# Patient Record
Sex: MALE | Race: WHITE | NOT HISPANIC OR LATINO | URBAN - METROPOLITAN AREA
[De-identification: names, ages, dates, MRNs, and addresses within clinical notes are randomized per-mention and may not be internally consistent; named-entity substitution may affect disease eponyms.]

---

## 2017-04-13 ENCOUNTER — AMBULATORY - HEALTHEAST (OUTPATIENT)
Dept: CARDIOLOGY | Facility: CLINIC | Age: 82
End: 2017-04-13

## 2017-04-13 DIAGNOSIS — Z95.0 PACEMAKER: ICD-10-CM

## 2017-05-12 ENCOUNTER — COMMUNICATION - HEALTHEAST (OUTPATIENT)
Dept: TELEHEALTH | Facility: CLINIC | Age: 82
End: 2017-05-12

## 2017-05-12 ENCOUNTER — OFFICE VISIT - HEALTHEAST (OUTPATIENT)
Dept: CARDIOLOGY | Facility: CLINIC | Age: 82
End: 2017-05-12

## 2017-05-12 DIAGNOSIS — I25.10 CORONARY ARTERY DISEASE INVOLVING NATIVE CORONARY ARTERY OF NATIVE HEART WITHOUT ANGINA PECTORIS: ICD-10-CM

## 2017-05-12 DIAGNOSIS — E78.5 DYSLIPIDEMIA: ICD-10-CM

## 2017-05-12 DIAGNOSIS — I35.0 NONRHEUMATIC AORTIC VALVE STENOSIS: ICD-10-CM

## 2017-05-12 DIAGNOSIS — I10 ESSENTIAL HYPERTENSION: ICD-10-CM

## 2017-05-12 ASSESSMENT — MIFFLIN-ST. JEOR: SCORE: 1400.15

## 2017-06-20 ENCOUNTER — HOSPITAL ENCOUNTER (OUTPATIENT)
Dept: CARDIOLOGY | Facility: HOSPITAL | Age: 82
Discharge: HOME | End: 2017-06-20
Attending: INTERNAL MEDICINE

## 2017-06-20 DIAGNOSIS — I35.0 NONRHEUMATIC AORTIC VALVE STENOSIS: ICD-10-CM

## 2017-06-20 ASSESSMENT — MIFFLIN-ST. JEOR: SCORE: 1400.15

## 2017-06-21 LAB
AORTIC ROOT: 3.7 CM
AORTIC VALVE MEAN VELOCITY: 67.7 CM/S
AV DIMENSIONLESS INDEX VTI: 0.9
AV MEAN GRADIENT: 2 MMHG
AV PEAK GRADIENT: 3.3 MMHG
AV VALVE AREA: 2.7 CM2
AV VELOCITY RATIO: 0.9
BSA FOR ECHO PROCEDURE: 1.93 M2
CV BLOOD PRESSURE: NORMAL MMHG
CV ECHO HEIGHT: 68 IN
CV ECHO WEIGHT: 171 LBS
DOP CALC AO PEAK VEL: 90.5 CM/S
DOP CALC AO VTI: 22.3 CM
DOP CALC LVOT AREA: 3.14 CM2
DOP CALC LVOT DIAMETER: 2 CM
DOP CALC LVOT PEAK VEL: 84.2 CM/S
DOP CALC LVOT STROKE VOLUME: 60.3 CM3
DOP CALCLVOT PEAK VEL VTI: 19.2 CM
EJECTION FRACTION: 74 % (ref 55–75)
FRACTIONAL SHORTENING: 48.8 % (ref 28–44)
INTERVENTRICULAR SEPTUM IN END DIASTOLE: 0.79 CM (ref 0.6–1)
IVS/PW RATIO: 0.6
LA AREA 1: 14.4 CM2
LA AREA 2: 16.6 CM2
LEFT ATRIUM LENGTH: 4.5 CM
LEFT ATRIUM SIZE: 3.6 CM
LEFT ATRIUM TO AORTIC ROOT RATIO: 0.97 NO UNITS
LEFT ATRIUM VOLUME INDEX: 23.4 ML/M2
LEFT ATRIUM VOLUME: 45.2 CM3
LEFT VENTRICLE CARDIAC INDEX: 1.9 L/MIN/M2
LEFT VENTRICLE CARDIAC OUTPUT: 3.7 L/MIN
LEFT VENTRICLE DIASTOLIC VOLUME INDEX: 49.4 CM3/M2 (ref 34–74)
LEFT VENTRICLE DIASTOLIC VOLUME: 95.3 CM3 (ref 62–150)
LEFT VENTRICLE HEART RATE: 61 BPM
LEFT VENTRICLE MASS INDEX: 74 G/M2
LEFT VENTRICLE SYSTOLIC VOLUME INDEX: 13.1 CM3/M2 (ref 11–31)
LEFT VENTRICLE SYSTOLIC VOLUME: 25.2 CM3 (ref 21–61)
LEFT VENTRICULAR INTERNAL DIMENSION IN DIASTOLE: 4.1 CM (ref 4.2–5.8)
LEFT VENTRICULAR INTERNAL DIMENSION IN SYSTOLE: 2.1 CM (ref 2.5–4)
LEFT VENTRICULAR MASS: 142.8 G
LEFT VENTRICULAR OUTFLOW TRACT MEAN GRADIENT: 2 MMHG
LEFT VENTRICULAR OUTFLOW TRACT MEAN VELOCITY: 54.6 CM/S
LEFT VENTRICULAR OUTFLOW TRACT PEAK GRADIENT: 3 MMHG
LEFT VENTRICULAR POSTERIOR WALL IN END DIASTOLE: 1.32 CM (ref 0.6–1)
LV STROKE VOLUME INDEX: 31.2 ML/M2
MITRAL VALVE DECELERATION SLOPE: 2530 MM/S2
MITRAL VALVE E/A RATIO: 1
MITRAL VALVE PRESSURE HALF-TIME: 89 MS
MV AVERAGE E/E' RATIO: 12.2 CM/S
MV DECELERATION TIME: 310 MS
MV E'TISSUE VEL-LAT: 7.25 CM/S
MV E'TISSUE VEL-MED: 5.03 CM/S
MV LATERAL E/E' RATIO: 10.3
MV MEDIAL E/E' RATIO: 14.9
MV PEAK A VELOCITY: 75.2 CM/S
MV PEAK E VELOCITY: 74.7 CM/S
MV VALVE AREA PRESSURE 1/2 METHOD: 2.5 CM2
NUC REST DIASTOLIC VOLUME INDEX: 2736 LBS
NUC REST SYSTOLIC VOLUME INDEX: 68 IN
RIGHT VENTRICULAR INTERNAL DIMENSION IN DYSTOLE: 1.43 CM

## 2017-07-20 ENCOUNTER — AMBULATORY - HEALTHEAST (OUTPATIENT)
Dept: CARDIOLOGY | Facility: CLINIC | Age: 82
End: 2017-07-20

## 2017-07-20 DIAGNOSIS — Z95.0 PACEMAKER: ICD-10-CM

## 2017-07-20 LAB — HCC DEVICE COMMENTS: NORMAL

## 2017-10-26 ENCOUNTER — AMBULATORY - HEALTHEAST (OUTPATIENT)
Dept: CARDIOLOGY | Facility: CLINIC | Age: 82
End: 2017-10-26

## 2017-10-26 DIAGNOSIS — Z95.0 PACEMAKER: ICD-10-CM

## 2017-10-27 LAB — HCC DEVICE COMMENTS: NORMAL

## 2017-11-17 ENCOUNTER — AMBULATORY - HEALTHEAST (OUTPATIENT)
Dept: CARDIOLOGY | Facility: CLINIC | Age: 82
End: 2017-11-17

## 2017-11-17 DIAGNOSIS — Z95.0 CARDIAC PACEMAKER IN SITU: ICD-10-CM

## 2017-11-17 LAB — HCC DEVICE COMMENTS: NORMAL

## 2017-11-17 ASSESSMENT — MIFFLIN-ST. JEOR: SCORE: 1404.69

## 2018-02-21 ENCOUNTER — AMBULATORY - HEALTHEAST (OUTPATIENT)
Dept: CARDIOLOGY | Facility: CLINIC | Age: 83
End: 2018-02-21

## 2018-02-21 DIAGNOSIS — Z95.0 CARDIAC PACEMAKER IN SITU: ICD-10-CM

## 2018-02-21 LAB — HCC DEVICE COMMENTS: NORMAL

## 2018-03-29 ENCOUNTER — AMBULATORY - HEALTHEAST (OUTPATIENT)
Dept: CARDIOLOGY | Facility: CLINIC | Age: 83
End: 2018-03-29

## 2018-03-29 DIAGNOSIS — Z95.0 CARDIAC PACEMAKER IN SITU: ICD-10-CM

## 2018-03-29 LAB — HCC DEVICE COMMENTS: NORMAL

## 2018-05-04 ENCOUNTER — AMBULATORY - HEALTHEAST (OUTPATIENT)
Dept: CARDIOLOGY | Facility: CLINIC | Age: 83
End: 2018-05-04

## 2018-05-04 DIAGNOSIS — Z95.0 CARDIAC PACEMAKER IN SITU: ICD-10-CM

## 2018-05-07 LAB
HCC DEVICE COMMENTS: NORMAL
HCC DEVICE IMPLANTING PROVIDER: NORMAL
HCC DEVICE MANUFACTURE: NORMAL
HCC DEVICE MODEL: NORMAL
HCC DEVICE SERIAL NUMBER: NORMAL
HCC DEVICE TYPE: NORMAL

## 2018-06-06 ENCOUNTER — AMBULATORY - HEALTHEAST (OUTPATIENT)
Dept: CARDIOLOGY | Facility: CLINIC | Age: 83
End: 2018-06-06

## 2018-06-06 DIAGNOSIS — Z95.0 CARDIAC PACEMAKER IN SITU: ICD-10-CM

## 2018-06-08 ENCOUNTER — COMMUNICATION - HEALTHEAST (OUTPATIENT)
Dept: ADMINISTRATIVE | Facility: CLINIC | Age: 83
End: 2018-06-08

## 2018-07-19 ENCOUNTER — AMBULATORY - HEALTHEAST (OUTPATIENT)
Dept: CARDIOLOGY | Facility: CLINIC | Age: 83
End: 2018-07-19

## 2018-07-19 DIAGNOSIS — Z95.0 CARDIAC PACEMAKER IN SITU: ICD-10-CM

## 2018-08-22 ENCOUNTER — AMBULATORY - HEALTHEAST (OUTPATIENT)
Dept: CARDIOLOGY | Facility: CLINIC | Age: 83
End: 2018-08-22

## 2018-08-22 DIAGNOSIS — Z95.0 CARDIAC PACEMAKER IN SITU: ICD-10-CM

## 2018-09-26 ENCOUNTER — AMBULATORY - HEALTHEAST (OUTPATIENT)
Dept: CARDIOLOGY | Facility: CLINIC | Age: 83
End: 2018-09-26

## 2018-09-26 ENCOUNTER — OFFICE VISIT - HEALTHEAST (OUTPATIENT)
Dept: CARDIOLOGY | Facility: CLINIC | Age: 83
End: 2018-09-26

## 2018-09-26 DIAGNOSIS — R01.1 HEART MURMUR: ICD-10-CM

## 2018-09-26 DIAGNOSIS — Z95.0 CARDIAC PACEMAKER IN SITU: ICD-10-CM

## 2018-09-26 DIAGNOSIS — I25.10 CORONARY ARTERY DISEASE INVOLVING NATIVE CORONARY ARTERY OF NATIVE HEART WITHOUT ANGINA PECTORIS: ICD-10-CM

## 2018-09-26 DIAGNOSIS — I10 ESSENTIAL HYPERTENSION: ICD-10-CM

## 2018-09-26 DIAGNOSIS — E78.5 DYSLIPIDEMIA: ICD-10-CM

## 2018-09-26 RX ORDER — CALCIUM POLYCARBOPHIL 625 MG
1 TABLET ORAL DAILY
Status: SHIPPED | COMMUNITY
Start: 2018-08-20

## 2018-09-26 ASSESSMENT — MIFFLIN-ST. JEOR: SCORE: 1379.74

## 2018-10-31 ENCOUNTER — AMBULATORY - HEALTHEAST (OUTPATIENT)
Dept: CARDIOLOGY | Facility: CLINIC | Age: 83
End: 2018-10-31

## 2018-10-31 DIAGNOSIS — Z95.0 CARDIAC PACEMAKER IN SITU: ICD-10-CM

## 2018-12-20 ENCOUNTER — AMBULATORY - HEALTHEAST (OUTPATIENT)
Dept: CARDIOLOGY | Facility: CLINIC | Age: 83
End: 2018-12-20

## 2018-12-20 DIAGNOSIS — Z95.0 CARDIAC PACEMAKER IN SITU: ICD-10-CM

## 2019-01-21 ENCOUNTER — AMBULATORY - HEALTHEAST (OUTPATIENT)
Dept: CARDIOLOGY | Facility: CLINIC | Age: 84
End: 2019-01-21

## 2019-01-21 DIAGNOSIS — Z95.0 CARDIAC PACEMAKER IN SITU: ICD-10-CM

## 2019-01-22 ENCOUNTER — COMMUNICATION - HEALTHEAST (OUTPATIENT)
Dept: CARDIOLOGY | Facility: CLINIC | Age: 84
End: 2019-01-22

## 2019-01-23 ENCOUNTER — AMBULATORY - HEALTHEAST (OUTPATIENT)
Dept: CARDIOLOGY | Facility: CLINIC | Age: 84
End: 2019-01-23

## 2019-01-23 ENCOUNTER — SURGERY - HEALTHEAST (OUTPATIENT)
Dept: CARDIOLOGY | Facility: CLINIC | Age: 84
End: 2019-01-23

## 2019-01-23 DIAGNOSIS — Z95.0 CARDIAC PACEMAKER IN SITU: ICD-10-CM

## 2019-01-23 DIAGNOSIS — I44.2 COMPLETE HEART BLOCK (H): ICD-10-CM

## 2019-01-23 DIAGNOSIS — Z45.010 PACEMAKER BATTERY DEPLETION: ICD-10-CM

## 2019-01-29 ENCOUNTER — COMMUNICATION - HEALTHEAST (OUTPATIENT)
Dept: CARDIOLOGY | Facility: CLINIC | Age: 84
End: 2019-01-29

## 2019-02-05 ENCOUNTER — SURGERY - HEALTHEAST (OUTPATIENT)
Dept: CARDIOLOGY | Facility: CLINIC | Age: 84
End: 2019-02-05

## 2019-02-05 ASSESSMENT — MIFFLIN-ST. JEOR: SCORE: 1395.17

## 2019-02-12 ENCOUNTER — AMBULATORY - HEALTHEAST (OUTPATIENT)
Dept: CARDIOLOGY | Facility: CLINIC | Age: 84
End: 2019-02-12

## 2019-02-12 DIAGNOSIS — Z95.0 CARDIAC PACEMAKER IN SITU: ICD-10-CM

## 2019-02-12 ASSESSMENT — MIFFLIN-ST. JEOR: SCORE: 1380.33

## 2019-02-21 ENCOUNTER — COMMUNICATION - HEALTHEAST (OUTPATIENT)
Dept: CARDIOLOGY | Facility: CLINIC | Age: 84
End: 2019-02-21

## 2019-05-15 ENCOUNTER — AMBULATORY - HEALTHEAST (OUTPATIENT)
Dept: CARDIOLOGY | Facility: CLINIC | Age: 84
End: 2019-05-15

## 2019-05-15 DIAGNOSIS — Z95.0 CARDIAC PACEMAKER IN SITU: ICD-10-CM

## 2019-05-22 ENCOUNTER — COMMUNICATION - HEALTHEAST (OUTPATIENT)
Dept: CARDIOLOGY | Facility: CLINIC | Age: 84
End: 2019-05-22

## 2019-07-24 ENCOUNTER — COMMUNICATION - HEALTHEAST (OUTPATIENT)
Dept: ADMINISTRATIVE | Facility: CLINIC | Age: 84
End: 2019-07-24

## 2019-08-19 ENCOUNTER — AMBULATORY - HEALTHEAST (OUTPATIENT)
Dept: CARDIOLOGY | Facility: CLINIC | Age: 84
End: 2019-08-19

## 2019-08-19 DIAGNOSIS — Z95.0 CARDIAC PACEMAKER IN SITU: ICD-10-CM

## 2019-10-29 ENCOUNTER — OFFICE VISIT - HEALTHEAST (OUTPATIENT)
Dept: CARDIOLOGY | Facility: CLINIC | Age: 84
End: 2019-10-29

## 2019-10-29 DIAGNOSIS — I35.0 NONRHEUMATIC AORTIC VALVE STENOSIS: ICD-10-CM

## 2019-10-29 DIAGNOSIS — I25.10 CORONARY ARTERY DISEASE INVOLVING NATIVE CORONARY ARTERY OF NATIVE HEART WITHOUT ANGINA PECTORIS: ICD-10-CM

## 2019-10-29 DIAGNOSIS — E78.5 DYSLIPIDEMIA: ICD-10-CM

## 2019-10-29 DIAGNOSIS — I10 ESSENTIAL HYPERTENSION: ICD-10-CM

## 2019-10-29 ASSESSMENT — MIFFLIN-ST. JEOR: SCORE: 1384.82

## 2019-11-19 ENCOUNTER — AMBULATORY - HEALTHEAST (OUTPATIENT)
Dept: CARDIOLOGY | Facility: CLINIC | Age: 84
End: 2019-11-19

## 2019-11-19 DIAGNOSIS — Z95.0 CARDIAC PACEMAKER IN SITU: ICD-10-CM

## 2019-11-19 DIAGNOSIS — I44.2 COMPLETE ATRIOVENTRICULAR BLOCK (H): ICD-10-CM

## 2019-12-19 ENCOUNTER — HOSPITAL ENCOUNTER (OUTPATIENT)
Dept: CARDIOLOGY | Facility: HOSPITAL | Age: 84
Discharge: HOME | End: 2019-12-19
Attending: INTERNAL MEDICINE

## 2019-12-19 DIAGNOSIS — I35.0 NONRHEUMATIC AORTIC VALVE STENOSIS: ICD-10-CM

## 2019-12-19 LAB
AORTIC ROOT: 3.6 CM
AORTIC VALVE MEAN VELOCITY: 140 CM/S
ASCENDING AORTA: 3.7 CM
AV DIMENSIONLESS INDEX VTI: 0.4
AV MEAN GRADIENT: 9 MMHG
AV PEAK GRADIENT: 17.3 MMHG
AV VALVE AREA: 1.7 CM2
BSA FOR ECHO PROCEDURE: 1.91 M2
CV BLOOD PRESSURE: ABNORMAL MMHG
CV ECHO HEIGHT: 67.3 IN
CV ECHO WEIGHT: 170 LBS
DOP CALC AO PEAK VEL: 208 CM/S
DOP CALC AO VTI: 48.3 CM
DOP CALC LVOT AREA: 4.15 CM2
DOP CALC LVOT DIAMETER: 2.3 CM
DOP CALC LVOT STROKE VOLUME: 80.1 CM3
DOP CALCLVOT PEAK VEL VTI: 19.3 CM
ECHO EJECTION FRACTION ESTIMATED: 60 %
FRACTIONAL SHORTENING: 32.6 % (ref 28–44)
INTERVENTRICULAR SEPTUM IN END DIASTOLE: 0.8 CM (ref 0.6–1)
IVS/PW RATIO: 0.7
LA AREA 1: 22.3 CM2
LA AREA 2: 21.5 CM2
LEFT ATRIUM LENGTH: 5.58 CM
LEFT ATRIUM SIZE: 4.5 CM
LEFT ATRIUM TO AORTIC ROOT RATIO: 1.25 NO UNITS
LEFT ATRIUM VOLUME INDEX: 38.2 ML/M2
LEFT ATRIUM VOLUME: 73 ML
LEFT VENTRICLE CARDIAC INDEX: 2.5 L/MIN/M2
LEFT VENTRICLE CARDIAC OUTPUT: 4.8 L/MIN
LEFT VENTRICLE HEART RATE: 60 BPM
LEFT VENTRICLE MASS INDEX: 74.6 G/M2
LEFT VENTRICULAR INTERNAL DIMENSION IN DIASTOLE: 4.3 CM (ref 4.2–5.8)
LEFT VENTRICULAR INTERNAL DIMENSION IN SYSTOLE: 2.9 CM (ref 2.5–4)
LEFT VENTRICULAR MASS: 142.5 G
LEFT VENTRICULAR OUTFLOW TRACT MEAN GRADIENT: 2 MMHG
LEFT VENTRICULAR OUTFLOW TRACT MEAN VELOCITY: 57.7 CM/S
LEFT VENTRICULAR POSTERIOR WALL IN END DIASTOLE: 1.2 CM (ref 0.6–1)
LV STROKE VOLUME INDEX: 42 ML/M2
MITRAL VALVE E/A RATIO: 0.7
MV AVERAGE E/E' RATIO: 12.7 CM/S
MV DECELERATION TIME: 250 MS
MV E'TISSUE VEL-LAT: 7.12 CM/S
MV E'TISSUE VEL-MED: 3.31 CM/S
MV LATERAL E/E' RATIO: 9.3
MV MEDIAL E/E' RATIO: 20
MV PEAK A VELOCITY: 101 CM/S
MV PEAK E VELOCITY: 66.1 CM/S
NUC REST DIASTOLIC VOLUME INDEX: 2720 LBS
NUC REST SYSTOLIC VOLUME INDEX: 67.25 IN
PR MAX PG: 4 MMHG
PR PEAK VELOCITY: 96.4 CM/S
TRICUSPID REGURGITATION PEAK PRESSURE GRADIENT: 18.8 MMHG
TRICUSPID VALVE ANULAR PLANE SYSTOLIC EXCURSION: 2.6 CM
TRICUSPID VALVE PEAK REGURGITANT VELOCITY: 217 CM/S

## 2019-12-19 ASSESSMENT — MIFFLIN-ST. JEOR: SCORE: 1383.7

## 2019-12-27 ENCOUNTER — AMBULATORY - HEALTHEAST (OUTPATIENT)
Dept: CARDIOLOGY | Facility: CLINIC | Age: 84
End: 2019-12-27

## 2019-12-27 DIAGNOSIS — I44.2 COMPLETE HEART BLOCK (H): ICD-10-CM

## 2019-12-27 DIAGNOSIS — Z95.0 CARDIAC PACEMAKER IN SITU: ICD-10-CM

## 2019-12-27 ASSESSMENT — MIFFLIN-ST. JEOR: SCORE: 1424.53

## 2020-03-25 ENCOUNTER — AMBULATORY - HEALTHEAST (OUTPATIENT)
Dept: CARDIOLOGY | Facility: CLINIC | Age: 85
End: 2020-03-25

## 2020-03-25 DIAGNOSIS — I44.2 COMPLETE HEART BLOCK (H): ICD-10-CM

## 2020-03-25 DIAGNOSIS — Z95.0 CARDIAC PACEMAKER IN SITU: ICD-10-CM

## 2020-06-24 ENCOUNTER — AMBULATORY - HEALTHEAST (OUTPATIENT)
Dept: CARDIOLOGY | Facility: CLINIC | Age: 85
End: 2020-06-24

## 2020-06-24 DIAGNOSIS — Z95.0 CARDIAC PACEMAKER IN SITU: ICD-10-CM

## 2020-06-24 DIAGNOSIS — I44.2 COMPLETE HEART BLOCK (H): ICD-10-CM

## 2020-12-04 ENCOUNTER — AMBULATORY - HEALTHEAST (OUTPATIENT)
Dept: CARDIOLOGY | Facility: CLINIC | Age: 85
End: 2020-12-04

## 2020-12-04 ENCOUNTER — COMMUNICATION - HEALTHEAST (OUTPATIENT)
Dept: CARDIOLOGY | Facility: CLINIC | Age: 85
End: 2020-12-04

## 2020-12-04 DIAGNOSIS — I44.2 COMPLETE HEART BLOCK (H): ICD-10-CM

## 2020-12-04 DIAGNOSIS — Z95.0 CARDIAC PACEMAKER IN SITU: ICD-10-CM

## 2020-12-10 ENCOUNTER — COMMUNICATION - HEALTHEAST (OUTPATIENT)
Dept: CARDIOLOGY | Facility: CLINIC | Age: 85
End: 2020-12-10

## 2020-12-11 ENCOUNTER — OFFICE VISIT - HEALTHEAST (OUTPATIENT)
Dept: CARDIOLOGY | Facility: CLINIC | Age: 85
End: 2020-12-11

## 2020-12-11 ENCOUNTER — COMMUNICATION - HEALTHEAST (OUTPATIENT)
Dept: CARDIOLOGY | Facility: CLINIC | Age: 85
End: 2020-12-11

## 2020-12-11 DIAGNOSIS — I48.91 ATRIAL FIBRILLATION (H): ICD-10-CM

## 2020-12-11 ASSESSMENT — MIFFLIN-ST. JEOR: SCORE: 1379.17

## 2021-01-21 ENCOUNTER — COMMUNICATION - HEALTHEAST (OUTPATIENT)
Dept: CARDIOLOGY | Facility: CLINIC | Age: 86
End: 2021-01-21

## 2021-01-22 ENCOUNTER — OFFICE VISIT - HEALTHEAST (OUTPATIENT)
Dept: CARDIOLOGY | Facility: CLINIC | Age: 86
End: 2021-01-22

## 2021-01-22 DIAGNOSIS — E78.5 DYSLIPIDEMIA: ICD-10-CM

## 2021-01-22 DIAGNOSIS — I10 ESSENTIAL HYPERTENSION: ICD-10-CM

## 2021-01-22 DIAGNOSIS — I48.0 PAROXYSMAL ATRIAL FIBRILLATION (H): ICD-10-CM

## 2021-01-22 DIAGNOSIS — I25.10 CORONARY ARTERY DISEASE INVOLVING NATIVE CORONARY ARTERY OF NATIVE HEART WITHOUT ANGINA PECTORIS: ICD-10-CM

## 2021-01-22 ASSESSMENT — MIFFLIN-ST. JEOR: SCORE: 1401.6

## 2021-03-23 ENCOUNTER — AMBULATORY - HEALTHEAST (OUTPATIENT)
Dept: CARDIOLOGY | Facility: CLINIC | Age: 86
End: 2021-03-23

## 2021-03-23 DIAGNOSIS — Z95.0 CARDIAC PACEMAKER IN SITU: ICD-10-CM

## 2021-03-23 DIAGNOSIS — I48.0 PAROXYSMAL ATRIAL FIBRILLATION (H): ICD-10-CM

## 2021-05-25 ENCOUNTER — RECORDS - HEALTHEAST (OUTPATIENT)
Dept: ADMINISTRATIVE | Facility: CLINIC | Age: 86
End: 2021-05-25

## 2021-05-28 ENCOUNTER — RECORDS - HEALTHEAST (OUTPATIENT)
Dept: ADMINISTRATIVE | Facility: CLINIC | Age: 86
End: 2021-05-28

## 2021-05-29 NOTE — TELEPHONE ENCOUNTER
Phone call placed to Mr. Tao.   I discussed the FDA Letter sent to [employer] regarding a rare potential battery issue with his pacemaker.   Mr. Tao instructed to make sure his  MyCareLink remote monitor is plugged in at all times so the Device Clinic can continue to monitor him as usual.  He is to call 9-1-1 should he experience sudden shortness of breath, chest pain, lightheadedness, dizziness or fainting.  He verbalizes understanding of these instructions and is agreeable to the plan.  Mr. Tao asking pertinent questions.  Mrs. Tao was also on the call and verbalizes understanding.  I thanked them for their time.  Miriam Strange, RN, MA  Device Nurse  [facility]

## 2021-05-30 ENCOUNTER — RECORDS - HEALTHEAST (OUTPATIENT)
Dept: ADMINISTRATIVE | Facility: CLINIC | Age: 86
End: 2021-05-30

## 2021-05-30 VITALS — BODY MASS INDEX: 25.91 KG/M2 | HEIGHT: 68 IN | WEIGHT: 171 LBS

## 2021-05-31 VITALS — WEIGHT: 171 LBS | BODY MASS INDEX: 25.91 KG/M2 | HEIGHT: 68 IN

## 2021-05-31 VITALS — HEIGHT: 68 IN | WEIGHT: 172 LBS | BODY MASS INDEX: 26.07 KG/M2

## 2021-06-01 VITALS — BODY MASS INDEX: 26.68 KG/M2 | HEIGHT: 67 IN | WEIGHT: 170 LBS

## 2021-06-02 ENCOUNTER — RECORDS - HEALTHEAST (OUTPATIENT)
Dept: ADMINISTRATIVE | Facility: CLINIC | Age: 86
End: 2021-06-02

## 2021-06-02 VITALS — WEIGHT: 173.4 LBS | BODY MASS INDEX: 27.21 KG/M2 | HEIGHT: 67 IN

## 2021-06-02 VITALS — BODY MASS INDEX: 26.53 KG/M2 | WEIGHT: 169 LBS | HEIGHT: 67 IN

## 2021-06-03 VITALS
RESPIRATION RATE: 14 BRPM | WEIGHT: 179 LBS | BODY MASS INDEX: 28.09 KG/M2 | HEART RATE: 84 BPM | HEIGHT: 67 IN | SYSTOLIC BLOOD PRESSURE: 150 MMHG | DIASTOLIC BLOOD PRESSURE: 70 MMHG

## 2021-06-03 VITALS — BODY MASS INDEX: 26.68 KG/M2 | WEIGHT: 170 LBS | HEIGHT: 67 IN

## 2021-06-03 VITALS
HEIGHT: 67 IN | SYSTOLIC BLOOD PRESSURE: 142 MMHG | RESPIRATION RATE: 16 BRPM | WEIGHT: 170 LBS | BODY MASS INDEX: 26.68 KG/M2 | DIASTOLIC BLOOD PRESSURE: 62 MMHG

## 2021-06-03 NOTE — PROGRESS NOTES
Remote device check.  Please see link for full device report.  Patient was informed of results and next follow up via mail.   Lyndsay Issa RN

## 2021-06-05 VITALS
HEIGHT: 67 IN | HEART RATE: 72 BPM | RESPIRATION RATE: 16 BRPM | DIASTOLIC BLOOD PRESSURE: 70 MMHG | WEIGHT: 169 LBS | SYSTOLIC BLOOD PRESSURE: 144 MMHG | BODY MASS INDEX: 26.53 KG/M2

## 2021-06-05 VITALS
SYSTOLIC BLOOD PRESSURE: 146 MMHG | HEIGHT: 68 IN | BODY MASS INDEX: 26.22 KG/M2 | RESPIRATION RATE: 16 BRPM | HEART RATE: 86 BPM | DIASTOLIC BLOOD PRESSURE: 74 MMHG | WEIGHT: 173 LBS

## 2021-06-10 NOTE — PROGRESS NOTES
Type: routine pacemaker remote transmission done prior to seeing MICHELLE on 5/12.   Presenting rhythm: AS-, rate 74bpm.  Battery/lead status: stable.  Arrhythmias: since 12/28/16; none detected.   Comments: normal pacemaker function. Routed results to MICHELLE. MADIE

## 2021-06-13 NOTE — PATIENT INSTRUCTIONS - HE
Aime,    It was a pleasure to see you today at [facility].    Please stop the aspirin and start Eliquis 5 mg by mouth twice a day.    You will see Dr. Dias in 6-8 weeks.     Please call us if you have any questions or concerns about your heart.      Surjit Elder M.D.  [facility]

## 2021-06-13 NOTE — TELEPHONE ENCOUNTER
Wellness Screening Tool  Symptom Screening:  Do you have one of the following NEW symptoms:    Fever (subjective or >100.0)?  No    A new cough?  No    Shortness of breath?  No     Chills? No     New loss of taste or smell? No     Generalized body aches? No     New persistent headache? No     New sore throat? No     Nausea, vomiting, or diarrhea?  No    Within the past 2 weeks, have you been exposed to someone with a known positive illness below:    COVID-19 (known or suspected)?  No    Chicken pox?  No    Mealses?  No    Pertussis?  No    Patient notified of visitor policy- No visitors are allowed to accompany the patient at this time. Yes  Pt informed to wear a mask: Yes  Pt notified if they develop any symptoms listed above, prior to their appointment, they are to call the clinic directly at [phone] for further instructions.  Yes  Patient's appointment status: Patient will be seen in clinic as scheduled on 12/11

## 2021-06-16 PROBLEM — I44.2 COMPLETE HEART BLOCK (H): Chronic | Status: ACTIVE | Noted: 2019-01-23

## 2021-06-16 PROBLEM — I73.9 PERIPHERAL ARTERIAL DISEASE (H): Chronic | Status: ACTIVE | Noted: 2019-01-31

## 2021-06-16 PROBLEM — Z95.0 CARDIAC PACEMAKER IN SITU: Chronic | Status: ACTIVE | Noted: 2017-11-17

## 2021-06-16 PROBLEM — I10 ESSENTIAL HYPERTENSION: Chronic | Status: ACTIVE | Noted: 2019-01-31

## 2021-06-16 PROBLEM — H90.3 SENSORINEURAL HEARING LOSS (SNHL) OF BOTH EARS: Chronic | Status: ACTIVE | Noted: 2020-12-11

## 2021-06-17 NOTE — TELEPHONE ENCOUNTER
"Telephone Encounter by Miriam Strange RN at 12/4/2020  3:21 PM     Author: Miriam Strange RN Service: -- Author Type: Registered Nurse    Filed: 12/4/2020  3:28 PM Encounter Date: 12/4/2020 Status: Signed    : Miriam Strange RN (Registered Nurse)       Type: alert remote pacemaker transmission for AT/AF exceeding burden.  Presenting rhythm: atrial fibrillation with ventricular pacing, rate 76 bpm.  Battery/lead status: stable  Arrhythmias; since 6/24/20, one mode switch, EGM confirms AF, currently in progress 10 hours, burden 0.3%, V-rates do not exceed 120 bpm. No ventricular high rate episodes detected.  Anticoagulant: none, takes 325mg ASA.  Comments: normal pacemaker function. Routed to device RN. Esther Gonzales, RDCS  [facility] Device Pool             Carelink   Alert for AF, in progress 10 hrs, V rates controlled, on ASA only. Pt was due for annual in September but did not schedule. What should next appt be?        Addendum: Transmission and chart reviewed.  Appears to be new onset atrial flutter (AFL).     Spoke with Mr. Tao after he calls the Device Clinic Nurse Line returning our call.  I discussed today's remote transmission findings with  and Mrs. Tao on their speaker phone.  He states, \"I'm fine.  I've been out raking today and sweeping.  No, no troubles.\"  He denies shortness of breath, light headedness, dizziness, pre-syncope, syncope, chest pain, diaphoresis, and nausea.  Routed to Dr. Dias regarding anticoagulation status.  I thanked Mr. Tao for returning our call.  Miriam Strange, RN, MA  Device Nurse  [facility]       "

## 2021-06-17 NOTE — TELEPHONE ENCOUNTER
Telephone Encounter by Nubia Cavazos RN at 12/4/2020  3:52 PM     Author: Nubia Cavazos RN Service: -- Author Type: Registered Nurse    Filed: 12/4/2020  3:55 PM Encounter Date: 12/4/2020 Status: Signed    : Nubia Cavazos RN (Registered Nurse)       Hi,  Please call patient to arrange follow up next available in Afib clinic or with Dr. Dias to discuss anticoagulation/A-flutter.  Thank you - Nubia   ----------------------------------------  Lisa Dias MD  You; Miriam Strange RN 4 minutes ago (3:46 PM)     Johnny Delacruz,   Please see note from Miriam.  Lets make arrangements for Aime to be seen in follow-up to discuss possible anticoagulation.  Patient could either follow-up with myself if there is an opening or with one of our NP's in the Atrial Fibrillation clinic which would would be fine as well.  Thanks.    Message text        Miriam Strange RN Reisdorf, Franz-Josef E, MD 22 minutes ago (3:28 PM)     New-onset A-Flutter, not on anticoagulant.   Thank you,   Miriam

## 2021-06-18 NOTE — LETTER
Letter by Tamara Martínez RN at      Author: Tamara Martínez RN Service: -- Author Type: --    Filed:  Encounter Date: 1/29/2019 Status: (Other)       Aime ERYN Tao  [address] [place] [address]                          Dear Aime Tao,    Important Information for Your Procedure    You are having a Pacemaker Battery Change Procedure:    Your procedure is scheduled for Tuesday 2/5/19  Please arrive at 7:00 am for registration and preporation for your procedure.   Getting Ready for Your Procedure:    You will need to contact your primary doctor Lui Donato MD ([phone]), and schedule a pre-operative history within 30 days of your procedure.    You will need to bring a current list of your medications with you for our admissions process the day of your procedure, please do not bring any of your own medications  with you to the hospital    The hospital cannot store your valuables, so please leave them at home    You will need to take off your jewelry prior to your procedure, we advise leaving your jewelry at home, as we cannot store your valuables    Please shower the morning of your procedure, or the night before    This is a same day procedure, in which you can expect to go home the same day. In any unforseen circumstanced this plan can change, but is unlikely.    Please make arrange for transportation home, as you will not be able to drive following your procedure  The Night Prior to Your Procedure:    Please do not have anything to eat or drink after Midnight (12:00am) prior to your procedure    It is important to stay well hydrated, and consume balanced meals the day prior to your procedure  Arriving for Your Procedure:  Please arrive at [facility], located at: [address] [address]    parking is available after 6:00am for your convenience, parking is also available in the parking ramp located off of [address] street attached to the hospital  If you  park in the ramp located on [address], take the elevator to level one. Enter the doors to the atrium/lobby area and check in at the main reception desk.   Please check in at the main reception desk in the lobby  You will be assisted to Cardiac Special Care on the third floor.  Going Home:    The physician and/or nurse will review any restrictions, follow-up requirements, and medication instructions prior to going home from the hospital in detail    Listed below are the restrictions that you can expect after your procedure:  You will not be able to shower for 3 days after your procedure, to reduce the risk for infection and disrupting your incision site.  You will not be allowed to drive for 24 hours after your procedure.  Clinic Contact Information:    You can contact our main clinic line at any time with questions, concerns, or if you need further information: [facility] [phone]  If you have further questions in regards to the scheduling of you procedure, please contact: Jasmina Gomez : The Cardiovascular Procedural Schedulers at [phone]    Medication prior to your procedure:      Please take your morning medications the day of your procedure with sips of water, except any multivitamins or supplements.    If you have any questions regarding your medication prior to your procedure please contact me at the number listed below.        Sincerely,  Tamara Martínez RN  Please call with any questions or concerns regarding the procedure at : [phone]

## 2021-06-19 NOTE — LETTER
Letter by Lyndsay Issa RN at      Author: Lyndsay Issa RN Service: -- Author Type: --    Filed:  Encounter Date: 11/19/2019 Status: Signed         Aime Tao  [address] [place] [address]      November 19, 2019      Dear Mr. Tao,    RE: Remote Results    We are writing to you regarding your recent Remote Pacemaker check from home. Your transmission was received successfully. Battery status is satisfactory at this time.     Your results are showing no significant changes.    Your next device appointment will be a clinic visit.  Please call in December to schedule.      To schedule or reschedule, please call [phone] and press 1.    NOTE: If you would like to do an extra transmission, please call [phone] and press 3 to speak to a nurse BEFORE transmitting. This ensures that the Device Clinic staff is aware of the reason you are sending a transmission, and can follow-up with you after it has been reviewed.    We will be checking your implanted device from home (remotely) every three months unless otherwise instructed. We will need to see you in the clinic at least once a year. You may need to be seen in the clinic sooner depending on the results of your check.    Please be aware:    The follow-up schedule is like a Physician prescription.    Your remote monitor is paired to your specific implanted device.      Sincerely,    [facility] Device Clinic

## 2021-06-19 NOTE — LETTER
Letter by Leticia Abdullahi at      Author: Leticia Abdullahi Service: -- Author Type: --    Filed:  Encounter Date: 5/15/2019 Status: (Other)         Aime Tao  [address] [place] [address]      May 15, 2019      Dear Mr. Tao,    RE: Remote Results    We are writing to you regarding your recent Remote Pacemaker check from home. Your transmission was received successfully. Battery status is satisfactory at this time.     Your results are within normal limits.    Your next device appointment will be a remote check on August 19, 2019; this will occur automatically.    To schedule or reschedule, please call [phone] and press 1.    NOTE: If you would like to do an extra transmission, please call [phone] and press 3 to speak to a nurse BEFORE transmitting. This ensures that the Device Clinic staff is aware of the reason you are sending a transmission, and can follow-up with you after it has been reviewed.    We will be checking your implanted device from home (remotely) every three months unless otherwise instructed. We will need to see you in the clinic at least once a year. You may need to be seen in the clinic sooner depending on the results of your check.    Please be aware:    The follow-up schedule is like a Physician prescription.    Your remote monitor is paired to your specific implanted device.      Sincerely,    [facility] Device Clinic

## 2021-06-19 NOTE — LETTER
Letter by Esther Morse RDCS at      Author: Esther Morse RDCS Service: -- Author Type: --    Filed:  Encounter Date: 8/19/2019 Status: (Other)         Aime Tao  [address] [place] [address]      August 19, 2019      Dear Mr. Tao,    RE: Remote Results    We are writing to you regarding your recent Remote Pacemaker check from home. Your transmission was received successfully. Battery status is satisfactory at this time.     Your results are being reviewed.  You will be contacted if further information is necessary.     Your next device appointment will be a remote check on November 19, 2019; this will occur automatically.    To schedule or reschedule, please call [phone] and press 1.    NOTE: If you would like to do an extra transmission, please call [phone] and press 3 to speak to a nurse BEFORE transmitting. This ensures that the Device Clinic staff is aware of the reason you are sending a transmission, and can follow-up with you after it has been reviewed.    We will be checking your implanted device from home (remotely) every three months unless otherwise instructed. We will need to see you in the clinic at least once a year. You may need to be seen in the clinic sooner depending on the results of your check.    Please be aware:    The follow-up schedule is like a Physician prescription.    Your remote monitor is paired to your specific implanted device.      Sincerely,    [facility] Device Clinic

## 2021-06-19 NOTE — LETTER
Letter by Lisa Dias MD at      Author: Lisa Dias MD Service: -- Author Type: --    Filed:  Encounter Date: 7/24/2019 Status: (Other)         Aime Tao  [address] [place] [address]      July 24, 2019      Dear Aime,    This letter is to remind you that you will be due for your follow up appointment with Dr. Twin Dias  . To help ensure you are in the best health possible, a regular follow-up with your cardiologist is essential.     Please call our Patient Scheduling Line at [phone] to schedule your appointment at your earliest convenience.  If you have recently scheduled an appointment, please disregard this letter.    We look forward to seeing you again. As always, we are available at the number  above for any questions or concerns you may have.      Sincerely,     The Physicians and Staff of [facility]

## 2021-06-20 NOTE — LETTER
Letter by Vanessa Mcclelland RN at      Author: Vanessa Mcclelland RN Service: -- Author Type: --    Filed:  Encounter Date: 6/24/2020 Status: (Other)         Aime Tao  [address] [place] [address]      June 24, 2020      Dear  Sahilmercedez,    RE: Remote Results    We are writing to you regarding your recent Remote Pacemaker check from home. Your transmission was received successfully. Battery status is satisfactory at this time.     Your results are showing no significant changes.    Your next device appointment will be a clinic visit.  Please call in July to schedule.      To schedule or reschedule, please call [phone] and press 1.    NOTE: If you would like to do an extra transmission, please call [phone] and press 3 to speak to a nurse BEFORE transmitting. This ensures that the Device Clinic staff is aware of the reason you are sending a transmission, and can follow-up with you after it has been reviewed.    We will be checking your implanted device from home (remotely) every three months unless otherwise instructed. We will need to see you in the clinic at least once a year. You may need to be seen in the clinic sooner depending on the results of your check.    Please be aware:    The follow-up schedule is like a Physician prescription.    Your remote monitor is paired to your specific implanted device.      Sincerely,    [facility] Device Clinic

## 2021-06-20 NOTE — PROGRESS NOTES
In clinic device check with Device RN followed by office visit with Dr. Dias.  Please see link for full device report.  Patient was informed of results and next follow up during today's visit.

## 2021-06-20 NOTE — PROGRESS NOTES
Remote device check.  Please see link for full device report.  Patient was informed of results and next follow up via mail.

## 2021-06-20 NOTE — LETTER
Letter by Leticia Abdullahi at      Author: Leticia Abdullahi Service: -- Author Type: --    Filed:  Encounter Date: 3/25/2020 Status: (Other)         Aime Tao  [address] [place] [address]      March 25, 2020      Dear Mr. Tao,    RE: Remote Results    We are writing to you regarding your recent Remote Pacemaker check from home. Your transmission was received successfully. Battery status is satisfactory at this time.     Your results are within normal limits.    Your next device appointment will be a remote check on June 24, 2020; this will occur automatically.    To schedule or reschedule, please call [phone] and press 1.    NOTE: If you would like to do an extra transmission, please call [phone] and press 3 to speak to a nurse BEFORE transmitting. This ensures that the Device Clinic staff is aware of the reason you are sending a transmission, and can follow-up with you after it has been reviewed.    We will be checking your implanted device from home (remotely) every three months unless otherwise instructed. We will need to see you in the clinic at least once a year. You may need to be seen in the clinic sooner depending on the results of your check.    Please be aware:    The follow-up schedule is like a Physician prescription.    Your remote monitor is paired to your specific implanted device.      Sincerely,    [facility] Device Clinic

## 2021-06-21 NOTE — LETTER
Letter by Esther Morse RDCS at      Author: Esther Morse RDCS Service: -- Author Type: --    Filed:  Encounter Date: 12/4/2020 Status: (Other)         Aime Tao  [address] [place] [address]      December 4, 2020      Dear Mr. Tao,    RE: Remote Results    We are writing to you regarding your recent Remote Pacemaker check from home. Your transmission was received successfully. Battery status is satisfactory at this time.     Your results are being reviewed.  You will be contacted if further information is necessary.     Your next device appointment will be a clinic visit.  Please call in January to schedule.      To schedule or reschedule, please call [phone] and press 1.    NOTE: If you would like to do an extra transmission, please call [phone] and press 3 to speak to a nurse BEFORE transmitting. This ensures that the Device Clinic staff is aware of the reason you are sending a transmission, and can follow-up with you after it has been reviewed.    We will be checking your implanted device from home (remotely) every three months unless otherwise instructed. We will need to see you in the clinic at least once a year. You may need to be seen in the clinic sooner depending on the results of your check.    Please be aware:    The follow-up schedule is like a Physician prescription.    Your remote monitor is paired to your specific implanted device.      Sincerely,    [facility] Device Clinic

## 2021-06-23 NOTE — TELEPHONE ENCOUNTER
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 Enter each lead  and model number.)  Last Interrogation Date: 09/26/2018       Atrium: Medtronic 5076 CapSureFix Novus              Lead Imp:  552Ohms, Pacing Threshold:  1V@0.4ms and Sensing Threshold:  2.544mV       Right Ventricle: Medtronic 5076 CapSureFix Novus              Lead Imp:  384Ohms, Pacing Threshold:  1.5V@0.8ms and Sensing Threshold:  paced        Old Leads Present/Abandoned: No     Lead Alert(s):  No     Diagnostic Information  Intrinsic Rhythm:  CHB without R waves present at VVI 30     Takes Anticoagulant? No     Pacing Percentages  Atrial Pacing 36.50% and Ventricle Pacing 99.96%  Pacemaker Dependent? Yes    Atrial lead is satisfactory; 36% atrial pacing  Ventricular lead is satisfactory, 100% pacing with pacemaker dependence/poor escape  Echocardiogram June 20, 2017 showed ejection fraction 74%  Comprehensive laboratory evaluation April 4, 2018 showed normal electrolytes, creatinine 0.78, cholesterol 156 with LDL 89    Plan  Schedule patient for a Medtronic pacemaker generator replacement/ADRIAN  Do not anticipate new leads were upgrades  Can be done as outpatient

## 2021-06-23 NOTE — PROGRESS NOTES
H&P done at [facility] 1/31/19 available in CARE EVERYWHERE.    Pre-Intra-Post education and instructions as listed below were reviewed with Patient via phone  2/4/2019 10:47 AM  Tamara Martínez RN

## 2021-06-23 NOTE — TELEPHONE ENCOUNTER
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 Enter each lead  and model number.)  Last Interrogation Date: 09/26/2018      Atrium: Medtronic 5076 CapSureFix Novus   Lead Imp:  552Ohms, Pacing Threshold:  1V@0.4ms and Sensing Threshold:  2.544mV      Right Ventricle: Medtronic 5076 CapSureFix Novus   Lead Imp:  384Ohms, Pacing Threshold:  1.5V@0.8ms and Sensing Threshold:  paced      Old Leads Present/Abandoned: No    Lead Alert(s):  No    Diagnostic Information  Intrinsic Rhythm:  CHB without R waves present at VVI 30    Takes Anticoagulant? No    Pacing Percentages  Atrial Pacing 36.50% and Ventricle Pacing 99.96%  Pacemaker Dependent? Yes      Ejection Fraction  Last EF Date:  06/20/2017    By Echocardiogram  Last EF Measurement:  74%  TDI Performed:  No  TDI Ordered:  No      Special Instructions/Timeframe for change-out:  ASAP    Routed to EP:  Yes: Dr. Lui Guerra    Device RN: Vanessa Mcclelland RN BSN

## 2021-06-23 NOTE — PROGRESS NOTES
6/22/1928  Home:[phone] (home) Cell:There is no such number on file (mobile).  Emergency Contact: Lisandra Tao [phone]    +++Important patient information for [facility]/Cath Lab staff : None+++    [facility] EP Cath Lab Procedure Order     Device Implant/Revision:  Procedure: Generator Change Out  Device Type: Dual Pacemaker  Device Company/Device Rep Needed for Procedure: Medtronic    Diagnosis:  Heart Block  Anticipated Case Duration:  Standard  Scheduling Needs/Timeframe:  Next Available  Anesthesia:  None  Research Protocol:  No    [facility] EP Cath Lab Prep   Ordering Provider: Dr Guerra  Ordering Date: 1/23/2019  Orders Status: Intial order placed and Order set placed    H&P:  Pt to schedule with PMD to complete  PCP: Lui Donato MD, [phone]    Pre-op Labs: Ordered AM of procedure    Medical Records Pertinent for Procedure:  Echo 6/20/17, EKG 3/25/11 and Device Implant Record 3/2/11    Patient Education:    Teach with Patient: Will be completed via phone prior to procedure.    Risks Reviewed:     Pacemaker Insertion    <1% for each of the following:  infection, bleeding, hematoma, pneumothorax, subclavian vein thrombosis, cardiac perforation, cardiac tamponade, arrhythmias, pectoral or diaphragmatic stimulation, air embolus, pocket erosion, device interactions.    <4% lead dislodgment, <1% lead fracture or generator  malfunction.    <0.5% CVA or MI.    <0.1% death.    If external defibrillation or CV is needed, 25% risk for superficial burn.    For patients on anticoagulation, the risk of bleeding, hematoma and tamponade are increased.      Consent: Will be obtained in [facility] day of procedure    Pre-Procedure Instructions that were Reviewed with Patient:  NPO after midnight, Remove all jewelry prior to coming in for procedure, Shower prior to arrival, Notified patient of time and date of procedure by CV , Transportation arrangements needed s/p procedure, Post-procedure follow up process, Sedation  plan/orders and Pre-procedure letter was sent to pt by CV     Pre-Procedure Medication Instructions:  Instructions given to pt regarding anticoagulants: Pt is not on an anticoagulant- N/A  Instructions given to pt regarding antiarrhythmic medication: N/A for this type of procedure; N/A  Instructions for medication, other than anticoagulants/antiarrhythmics listed above, given to pt: to take all morning medications with small sips of water, with the exception of OTC supplements and MVI      Allergies   Allergen Reactions     Definity [Perflutren Lipid Microspheres] Other (See Comments)     Patient c/o feeling flushed after definity injected.      Iodinated Contrast- Oral And Iv Dye      Neomycin-Bacitracin-Polymyxin        Current Outpatient Medications:      aspirin 325 MG tablet, Take 325 mg by mouth daily. , Disp: , Rfl:      atorvastatin (LIPITOR) 80 MG tablet, Take 80 mg by mouth at bedtime. , Disp: , Rfl:      calcium polycarbophil (FIBERCON) 625 mg tablet, Take 1 tablet by mouth daily., Disp: , Rfl:      DOCUSATE CALCIUM (STOOL SOFTENER ORAL), Take 1-2 tablets by mouth daily., Disp: , Rfl:      IBUPROFEN (ADVIL ORAL), Take 1 tablet by mouth as needed., Disp: , Rfl:      losartan (COZAAR) 100 MG tablet, Take 100 mg by mouth daily. , Disp: , Rfl:      phenytoin (DILANTIN EXTENDED) 100 MG ER capsule, Take 300 mg by mouth daily. , Disp: , Rfl:      psyllium seed, sugar, (METAMUCIL) Powd, daily. , Disp: , Rfl:      VIT C/VIT E/LUTEIN/MIN/OMEGA-3 (OCUVITE ORAL), 1 tablet daily., Disp: , Rfl:     Documentation Date:1/23/2019 8:44 AM  Tamara Bedoya RN

## 2021-06-24 NOTE — PATIENT INSTRUCTIONS - HE
Pacemaker Post-operative Checklist      The Device Registered Nurse (RN) reviewed the pacemaker function.      The Device RN did a wound assessment and wound care teaching.    Please call the Device Nurses with any signs of infection or questions regarding wound healing. Device Nurse Line: [phone], Option #3      The Device RN demonstrated and displayed the specific remote monitoring system for your pacemaker.      The Device RN reviewed the Partnership Agreement Form.    Patient Instructions      To reduce the risk of infection, try to avoid any dental procedures for the first 6 weeks after your pacemaker implant. If you have an emergent or urgent dental need during this time, contact the device clinic for a prescription for an antibiotic.      You will receive a device identification (ID) card in the mail from the device  within 6 weeks to replace the temporary ID card you were given in the hospital.      You may travel by any mode of transportation; just show your pacemaker ID card. You may be subject to a hand search or use of a handheld wand, but official should not keep the wand over the implant site for greater than 5-10 seconds.      For any surgery, let your doctor know you have a pacemaker.       Your pacemaker is MRI safe       Most household appliances, including a microwave, will not interfere with your pacemaker function. If you suspect interference, simply move away from the source. Cell phones do not cause a problem. Please refer to the device booklet from the  or their website under the section on electromagnetic interference (FARAZ) for further guidelines on things that may interfere with your pacemaker.       Device Clinic Contact Information  Device Nurses: [phone], Option #3    Device Remote Specialists: [phone], Option #2. For questions about your Remote Transmission or Transmission Schedule  Device Schedulers: [phone], Option #1

## 2021-06-24 NOTE — PROGRESS NOTES
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 Lui Guerra        Assessment  Subjective: Aime denies any pain or discomfort.   Vitals:   Vitals:    02/12/19 1334   BP: 148/66   Pulse: 72   Resp: 16   Temp: 97.8  F (36.6  C)     Incision/device pocket: The steri-strips were removed from the incision and it was cleansed with adhesive remover and alcohol wipes. The incision is clean, dry and intact. There are no signs of infection present. The incision is well healed.        Device Findings  Interrogation of device reveals normal sensing and capture thresholds  See the Paceart Report for a full summary of the device information.        Patient Education  Aime Tao was accompanied today by his spouse     [facility]'s Partnership Agreement for Device Patients and Post-operative Checklist were presented and reviewed with patient at today's appointment.    Signs and symptoms of infection, poor wound healing, and device function were reviewed.  He was issued a Carelink remote monitor and instructed on its set-up and use for remote monitoring by the [employer] Rep representative prior to hospital discharge. These instructions were reviewed with the patient at today s visit.       Plan  Remote from home in 3 months on 5/15/2019    Brooklyn Carranza RN BSN PHN  Device Nurse   [facility]

## 2021-06-25 NOTE — PROGRESS NOTES
Progress Notes by Lisa Dias MD at 5/12/2017  8:30 AM     Author: Lisa Dias MD Service: -- Author Type: Physician    Filed: 5/12/2017  8:36 AM Encounter Date: 5/12/2017 Status: Signed    : Lisa Dias MD (Physician)                  [email or web address]           Thank you Dr. Donato for asking the [facility] Heart Care team to participate in the care of your patient, Aime Tao.     Impression and Plan     1. Coronary artery disease. Aime has known coronary artery disease having had surgical revascularization in September 2001. At that time, he had an DEVIN graft to the LAD and saphenous vein graft to the right coronary artery.     This is stable. Patient reports no concerning chest pain or other symptoms.     2. Hypertension. Blood pressure is fairly reasonable in the office today at 135/64 mmHg.     3. History of conduction disease (status post Medtronic permanent pacemaker placement 2 March 2011). Device interrogation today interrogation normal pacer and magnet function.    Echocardiogram to rule out outside chance of pacemaker-induced depression in left ventricle systolic performance.     4. Systolic murmur. Patient was systolic murmur with echocardiogram 23 March 2015 revealing mild sclerosis of the aortic valve without significant valvular stenosis.     5. Dyslipidemia. Recent lipid profile 28 March 2016 was at/near target with LDL 90 mg/dL and HDL 52 mg/dL.     Plan on follow-up in one year.  Aime will be followed intermittently through the device clinic per protocol as well.           History of Present Illness    Once again I would like to thank you again for asking me to participate in the care of your patient, Aime Tao.  As you know, but to reiterate for my own records, Aime Tao is a 88 y.o. male with known coronary disease, having had coronary artery bypass surgery in September 2001. At that time, he had an DEVIN graft to the LAD and  saphenous vein graft to the right coronary artery. Historically, he has had well-preserved LV systolic performance.      In 2011 he had presented with bradycardia and evidence of advanced conduction disease for which he underwent permanent pacemaker placement (Medtronic device implanted 2 March 2011).     In follow-up today, patient is without complaint. He denies any chest pain, shortness of breath, or diminished exercise tolerance. He reports no palpitations or lightheadedness.     Further review of systems is otherwise negative/noncontributory (medical record and 13 point review of systems reviewed as well and pertinent positives noted).         Cardiac Diagnostics      Echocardiogram 23 March 2015 (personally reviewed):  1. Normal left ventricular size and systolic performance. The ejection fraction is estimated to be 50-55%.  2. Left ventricular wall thickness is mildly increased.  3. There is mild aortic valve sclerosis without significant valvular stenosis.  4. The left atrium is mildly enlarged.    When compared to the prior real-time echocardiogram dated 11 March 2013, there has been little appreciable interval change.    Echocardiogram 17 December 2011:  1. Normal LV size and function with ejection fraction of 60%.    2. Mild left ventricular hypertrophy.  3. Mild-moderate mitral insufficiency.    4. Mild right atrial enlargement.     Device interrogation 13 April 2017 (Medtronic device implanted 2 March 2011):  1. Atrial sensing with ventricular pacing.  2. Stable battery and lead status.  3. Since 28 December 2016, no arrhythmias detected.  4. Normal pacemaker function.    Device interrogation 7 April 2016 (Medtronic device implanted 2 March 2011):  1. Presenting rhythm is AV sequential pacing.  2. Stable battery and lead status.  3. Since 9 September 2015, no arrhythmias detected.  4. Normal pacemaker function.     Device interrogation 9 December 2015 (Medtronic device implanted 2 March  "2011):  1. Presenting rhythm is AV sequential pacing.  2. Stable battery and lead status.  3. Since 9 September 2015, no arrhythmias detected.  4. Normal pacemaker function.           Physical Examination       /64 (Patient Site: Left Arm, Patient Position: Sitting, Cuff Size: Adult Regular)  Pulse 76  Resp 16  Ht 5' 8\" (1.727 m)  Wt 171 lb (77.6 kg)  BMI 26 kg/m2        Wt Readings from Last 3 Encounters:   05/12/17 171 lb (77.6 kg)   04/07/16 170 lb (77.1 kg)   03/11/15 180 lb (81.6 kg)       The patient is alert and oriented times three. Sclerae are anicteric. Mucosal membranes are moist. Jugular venous pressure is normal. No significant adenopathy/thyromegally appreciated. Lungs are clear with good expansion. On cardiovascular exam, the patient has a regular S1 and S2. 2/6 systolic murmur with a slight musical quality.Abdomen is soft and non-tender. Extremities reveal no clubbing, cyanosis, or edema.         Family History/Social History/Risk Factors   Patient does not smoke.  Family history of hypertension.         Medications  Allergies   Current Outpatient Prescriptions   Medication Sig Dispense Refill   ? aspirin 325 MG tablet Take 325 mg by mouth daily.      ? atorvastatin (LIPITOR) 80 MG tablet 80 mg bedtime.     ? DOCUSATE CALCIUM (STOOL SOFTENER ORAL) Take 1-2 tablets by mouth daily.     ? losartan (COZAAR) 100 MG tablet Take 100 mg by mouth daily.      ? phenytoin (DILANTIN EXTENDED) 100 MG ER capsule Take 300 mg by mouth daily.      ? psyllium seed, sugar, (METAMUCIL) Powd daily.      ? VIT C/VIT E/LUTEIN/MIN/OMEGA-3 (OCUVITE ORAL) 1 tablet daily.     ? IBUPROFEN (ADVIL ORAL) Take 1 tablet by mouth as needed.       No current facility-administered medications for this visit.       Allergies   Allergen Reactions   ? Definity [Perflutren Lipid Microspheres] Other (See Comments)     Patient c/o feeling flushed after definity injected.    ? Iodinated Contrast Media - Oral And Iv Dye    ? " Neomycin-Bacitracin-Polymyxin           Lab Results   Lab Results   Component Value Date     03/25/2011    K 4.1 03/25/2011     (H) 03/25/2011    CO2 25 03/25/2011    BUN 16 03/25/2011    CREATININE 0.69 (L) 03/25/2011    CALCIUM 8.9 03/25/2011     Lab Results   Component Value Date    WBC 5.7 03/25/2011    HGB 13.3 (L) 03/25/2011    HCT 39.6 (L) 03/25/2011    MCV 92 03/25/2011     03/25/2011     Lab Results   Component Value Date    INR 1.06 03/25/2011     Lab Results   Component Value Date    BNP 70 03/25/2011     Lab Results   Component Value Date    CKMB 2 03/25/2011    TROPONINI <0.01 03/25/2011

## 2021-06-26 NOTE — PROGRESS NOTES
Progress Notes by Lisa Dias MD at 9/26/2018  1:10 PM     Author: Lisa Dias MD Service: -- Author Type: Physician    Filed: 9/26/2018  1:01 PM Encounter Date: 9/26/2018 Status: Signed    : Lisa Dias MD (Physician)                  [email or web address]  [phone]         Thank you Dr. Donato for asking the [facility] Heart Care team to participate in the care of your patient, Aime Tao.     Impression and Plan     1. Coronary artery disease. Aime has known coronary artery disease having had surgical revascularization in September 2001. At that time, he had an DEVIN graft to the LAD and saphenous vein graft to the right coronary artery.      This is stable. Patient reports no concerning chest pain or other symptoms.     2. Hypertension. Blood pressure is fairly reasonable in the office today at 138/68 mmHg.     3. History of conduction disease (status post Medtronic permanent pacemaker placement 2 March 2011). Device interrogation today interrogation normal pacer and magnet function.     4. Systolic murmur.  Echocardiogram 20 June 2017 revealed evidence of aortic valve sclerosis, but no significant stenosis.     5. Dyslipidemia. Recent lipid profile 28 March 2016 was at/near target with LDL 90 mg/dL and HDL 52 mg/dL. Patient states this has been monitored through primary providers.      Plan on follow-up in one year.  Aime will be followed intermittently through the device clinic per protocol as well.           History of Present Illness    Once again I would like to thank you again for asking me to participate in the care of your patient, Aime Tao.  As you know, but to reiterate for my own records, Aime Tao is a 89 y.o. male with known coronary disease, having had coronary artery bypass surgery in September 2001. At that time, he had an DEVIN graft to the LAD and saphenous vein graft to the right coronary artery. Historically, he has had  well-preserved LV systolic performance.      In 2011 he had presented with bradycardia and evidence of advanced conduction disease for which he underwent permanent pacemaker placement (Medtronic device implanted 2 March 2011).     In follow-up today, patient is without complaint. He denies any chest pain, shortness of breath, or diminished exercise tolerance. He reports no palpitations or lightheadedness.     Further review of systems is otherwise negative/noncontributory (medical record and 13 point review of systems reviewed as well and pertinent positives noted).         Cardiac Diagnostics      Echocardiogram 20 June 2017:  1. Normal left ventricle size and systolic performance with ejection fraction of 70-75%.  2. Aortic valve sclerosis without significant stenosis.  3. Normal right ventricular size and systolic performance.  4. Normal atrial dimensions.    Echocardiogram 23 March 2015 (personally reviewed):  1. Normal left ventricular size and systolic performance. The ejection fraction is estimated to be 50-55%.  2. Left ventricular wall thickness is mildly increased.  3. There is mild aortic valve sclerosis without significant valvular stenosis.  4. The left atrium is mildly enlarged.    When compared to the prior real-time echocardiogram dated 11 March 2013, there has been little appreciable interval change.    Echocardiogram 17 December 2011:  1. Normal LV size and function with ejection fraction of 60%.    2. Mild left ventricular hypertrophy.  3. Mild-moderate mitral insufficiency.    4. Mild right atrial enlargement.    Device interrogation 22 August 2018 (Medtronic [id number] EnRhythm device implanted to March 2011):  1. Presenting rhythm: atrial synchronous ventricular pacing, rate 70 bpm.  2. Battery/lead status: stable, nearing ADRIAN, continue monthly remotes.  3. Arrhythmias: since 19 July 2018, none detected.  4. Comments: normal pacemaker function.     Device interrogation 13 April 2017 ([employer]  "device implanted 2 March 2011):  1. Atrial sensing with ventricular pacing.  2. Stable battery and lead status.  3. Since 28 December 2016, no arrhythmias detected.  4. Normal pacemaker function.    Device interrogation 7 April 2016 (Medtronic device implanted 2 March 2011):  1. Presenting rhythm is AV sequential pacing.  2. Stable battery and lead status.  3. Since 9 September 2015, no arrhythmias detected.  4. Normal pacemaker function.    Device interrogation 9 December 2015 (Medtronic device implanted 2 March 2011):  1. Presenting rhythm is AV sequential pacing.  2. Stable battery and lead status.  3. Since 9 September 2015, no arrhythmias detected.  4. Normal pacemaker function.       Physical Examination       /68 (Patient Site: Left Arm, Patient Position: Sitting, Cuff Size: Adult Regular)  Pulse 64  Resp 16  Ht 5' 7\" (1.702 m)  Wt 170 lb (77.1 kg)  BMI 26.63 kg/m2        Wt Readings from Last 3 Encounters:   09/26/18 170 lb (77.1 kg)   11/17/17 172 lb (78 kg)   06/20/17 171 lb (77.6 kg)       The patient is alert and oriented times three. Sclerae are anicteric. Mucosal membranes are moist. Jugular venous pressure is normal. No significant adenopathy/thyromegally appreciated. Lungs are clear with good expansion. On cardiovascular exam, the patient has a regular S1 and S2. There is a 2/6 systolic murmur heard at the right sternal border. Abdomen is soft and non-tender. Extremities reveal no clubbing, cyanosis, or edema.         Family History/Social History/Risk Factors   Patient does not smoke.  Family history of hypertension.         Medications  Allergies   Current Outpatient Prescriptions   Medication Sig Dispense Refill   ? aspirin 325 MG tablet Take 325 mg by mouth daily.      ? atorvastatin (LIPITOR) 80 MG tablet Take 80 mg by mouth at bedtime.      ? calcium polycarbophil (FIBERCON) 625 mg tablet Take 1 tablet by mouth daily.     ? DOCUSATE CALCIUM (STOOL SOFTENER ORAL) Take 1-2 tablets by " mouth daily.     ? IBUPROFEN (ADVIL ORAL) Take 1 tablet by mouth as needed.     ? losartan (COZAAR) 100 MG tablet Take 100 mg by mouth daily.      ? phenytoin (DILANTIN EXTENDED) 100 MG ER capsule Take 300 mg by mouth daily.      ? psyllium seed, sugar, (METAMUCIL) Powd daily.      ? VIT C/VIT E/LUTEIN/MIN/OMEGA-3 (OCUVITE ORAL) 1 tablet daily.       No current facility-administered medications for this visit.       Allergies   Allergen Reactions   ? Definity [Perflutren Lipid Microspheres] Other (See Comments)     Patient c/o feeling flushed after definity injected.    ? Iodinated Contrast- Oral And Iv Dye    ? Neomycin-Bacitracin-Polymyxin           Lab Results   Lab Results   Component Value Date     03/25/2011    K 4.1 03/25/2011     (H) 03/25/2011    CO2 25 03/25/2011    BUN 16 03/25/2011    CREATININE 0.69 (L) 03/25/2011    CALCIUM 8.9 03/25/2011     Lab Results   Component Value Date    WBC 5.7 03/25/2011    HGB 13.3 (L) 03/25/2011    HCT 39.6 (L) 03/25/2011    MCV 92 03/25/2011     03/25/2011     Lab Results   Component Value Date    INR 1.06 03/25/2011     Lab Results   Component Value Date    BNP 70 03/25/2011     Lab Results   Component Value Date    CKMB 2 03/25/2011    TROPONINI <0.01 03/25/2011

## 2021-06-28 NOTE — PROGRESS NOTES
Progress Notes by Lisa Dias MD at 10/29/2019  8:30 AM     Author: Lisa Dias MD Service: -- Author Type: Physician    Filed: 10/29/2019  9:05 AM Encounter Date: 10/29/2019 Status: Signed    : Lisa Dias MD (Physician)                                       Thank you Dr. Donato for asking the [facility] team to participate in the care of your patient, Aime Tao.     Impression and Plan     1. Coronary artery disease. Aime has known coronary artery disease having had surgical revascularization in September 2001. At that time, he had an DEVIN graft to the LAD and saphenous vein graft to the right coronary artery.      This is stable. Patient reports no concerning chest pain or other symptoms.     2. Hypertension. Blood pressure is mildly elevated in the office today.  Other readings at home have been more favorable.     3. History of conduction disease (status post Medtronic permanent pacemaker placement 2 March 2011). Device interrogation today interrogation normal pacer and magnet function.  Patient underwent generator change 5 February 2019.     4. Systolic murmur.  Echocardiogram 20 June 2017 revealed evidence of aortic valve sclerosis, but no significant stenosis.    Will obtain repeat echocardiogram at this time.    5. Dyslipidemia.  Lipid profile 5 April 2019 was close to target with LDL 90 mg/dL and HDL 56 mg/dL.     Plan on follow-up in one year.  Aime will be followed intermittently through the device clinic per protocol as well.         History of Present Illness    Once again I would like to thank you again for asking me to participate in the care of your patient, Aime Tao.  As you know, but to reiterate for my own records, Aime Tao is a 89 y.o. male with known coronary disease, having had coronary artery bypass surgery in September 2001. At that time, he had an DEVIN graft to the LAD and saphenous vein graft to the right coronary  artery. Historically, he has had well-preserved LV systolic performance.      In 2011 he had presented with bradycardia and evidence of advanced conduction disease for which he underwent permanent pacemaker placement (Medtronic device implanted 2 March 2011).  Patient underwent generator change 5 February 2019.     In follow-up today, patient is without complaint. He denies any chest pain, shortness of breath, or diminished exercise tolerance. He reports no palpitations or lightheadedness.     Further review of systems is otherwise negative/noncontributory (medical record and 13 point review of systems reviewed as well and pertinent positives noted).         Cardiac Diagnostics      Echocardiogram 20 June 2017:  1. Normal left ventricle size and systolic performance with ejection fraction of 70-75%.  2. Aortic valve sclerosis without significant stenosis.  3. Normal right ventricular size and systolic performance.  4. Normal atrial dimensions.    Echocardiogram 23 March 2015 (personally reviewed):  1. Normal left ventricular size and systolic performance. The ejection fraction is estimated to be 50-55%.  2. Left ventricular wall thickness is mildly increased.  3. There is mild aortic valve sclerosis without significant valvular stenosis.  4. The left atrium is mildly enlarged.  5. When compared to the prior real-time echocardiogram dated 11 March 2013, there has been little appreciable interval change.    Echocardiogram 17 December 2011:  1. Normal LV size and function with ejection fraction of 60%.    2. Mild left ventricular hypertrophy.  3. Mild-moderate mitral insufficiency.    4. Mild right atrial enlargement.    Device interrogation 19 August 2019 (Medtronic [id number] [place] XT DR MRI device implanted 5 February 2019):  1. Battery/lead status: stable  2. Arrhythmias: since 15 May 2019, total time in AT/AF 3 seconds. No ventricular arrhythmias detected.  3. Comments: normal pacemaker function. Will review battery  "longevity estimate with device RN.   4. ADD: Contacted TS. Reviewed further. Reviewed 15 day detail on RV lead to ensure no aborted reading and accurate auto capture. TS thinks it related to higher outputs on RV lead and that the battery is regularizing since it has been in for 6 months now. She is confirming with an : \"When running the calculator estimation at RV outputs 2.75V/.4ms and running it at 3.75V/.4ms, there was about a 2 year difference b/t the 2. In looking at the actual CL estimation, it was shown that the May 2019 showed estimated longevity b/t 8.6-9.4 years and the August check showed 6.5-7.1 years longevity estimation(also indicating about a 2 years difference in longevity). Based on the information provided by the engineers, this seems reasonable that an increase in RV outputs to 3.75V/.4ms could cause the observed reduction in longevity. There may be additional differences in the estimations due to telemetry or other changes in outputs, etc.\" Therefore, will continue to monitor at this time.    Device interrogation 22 August 2018 (Medtronic [id number] EnRhythm device implanted to March 2011):  1. Presenting rhythm: atrial synchronous ventricular pacing, rate 70 bpm.  2. Battery/lead status: stable, nearing ADRIAN, continue monthly remotes.  3. Arrhythmias: since 19 July 2018, none detected.  4. Comments: normal pacemaker function.     Device interrogation 13 April 2017 (Medtronic device implanted 2 March 2011):  1. Atrial sensing with ventricular pacing.  2. Stable battery and lead status.  3. Since 28 December 2016, no arrhythmias detected.  4. Normal pacemaker function.    Device interrogation 7 April 2016 (Medtronic device implanted 2 March 2011):  1. Presenting rhythm is AV sequential pacing.  2. Stable battery and lead status.  3. Since 9 September 2015, no arrhythmias detected.  4. Normal pacemaker function.    Device interrogation 9 December 2015 (Medtronic device implanted 2 March " "2011):  1. Presenting rhythm is AV sequential pacing.  2. Stable battery and lead status.  3. Since 9 September 2015, no arrhythmias detected.  4. Normal pacemaker function.           Physical Examination       /62 (Patient Site: Right Arm, Patient Position: Sitting, Cuff Size: Adult Regular)   Resp 16   Ht 5' 7.32\" (1.71 m)   Wt 170 lb (77.1 kg)   BMI 26.37 kg/m          Wt Readings from Last 3 Encounters:   10/29/19 170 lb (77.1 kg)   02/12/19 169 lb (76.7 kg)   02/05/19 173 lb 6.4 oz (78.7 kg)     The patient is alert and oriented times three. Sclerae are anicteric. Mucosal membranes are moist. Jugular venous pressure is normal. No significant adenopathy/thyromegally appreciated. Lungs are clear with good expansion. On cardiovascular exam, the patient has a regular S1 and S2.  3/6 systolic murmur which is early peaking.  Abdomen is soft and non-tender. Extremities reveal no clubbing, cyanosis, or edema.       Family History/Social History/Risk Factors   Patient does not smoke.  Family history reviewed, and family history includes Hypertension in his father.          Medications  Allergies   Current Outpatient Medications   Medication Sig Dispense Refill   ? aspirin 325 MG tablet Take 325 mg by mouth daily.      ? atorvastatin (LIPITOR) 80 MG tablet Take 80 mg by mouth at bedtime.      ? calcium polycarbophil (FIBERCON) 625 mg tablet Take 1 tablet by mouth daily.     ? cyanocobalamin 1000 MCG tablet Take 1,000 mcg by mouth daily.     ? DOCUSATE CALCIUM (STOOL SOFTENER ORAL) Take 1-2 tablets by mouth daily.     ? IBUPROFEN (ADVIL ORAL) Take 800 mg by mouth as needed.            ? losartan (COZAAR) 100 MG tablet Take 100 mg by mouth at bedtime.            ? phenytoin (DILANTIN EXTENDED) 100 MG ER capsule Take 300 mg by mouth daily with supper.            ? VIT C/VIT E/LUTEIN/MIN/OMEGA-3 (OCUVITE ORAL) 1 tablet daily.     ? psyllium seed, sugar, (METAMUCIL) Powd daily.        No current " facility-administered medications for this visit.       Allergies   Allergen Reactions   ? Definity [Perflutren Lipid Microspheres] Other (See Comments)     Patient c/o feeling flushed after definity injected.    ? Iodinated Contrast Media    ? Neomycin-Bacitracin-Polymyxin    ? Niacin Other (See Comments)          Lab Results   Lab Results   Component Value Date     02/05/2019    K 4.1 02/05/2019     02/05/2019    CO2 25 02/05/2019    BUN 20 02/05/2019    CREATININE 0.82 02/05/2019    CALCIUM 8.5 02/05/2019     Lab Results   Component Value Date    WBC 5.3 02/05/2019    HGB 12.5 (L) 02/05/2019    HCT 39.0 (L) 02/05/2019    MCV 94 02/05/2019     02/05/2019     Lab Results   Component Value Date    INR 1.06 03/25/2011     Lab Results   Component Value Date    BNP 70 03/25/2011     Lab Results   Component Value Date    CKMB 2 03/25/2011    TROPONINI <0.01 03/25/2011

## 2021-06-30 NOTE — PROGRESS NOTES
Progress Notes by Lisa Dias MD at 1/22/2021  4:10 PM     Author: Lisa Dias MD Service: -- Author Type: Physician    Filed: 1/22/2021  4:32 PM Encounter Date: 1/22/2021 Status: Signed    : Lisa Dias MD (Physician)                                       Thank you Dr. Donato for asking the [facility] team to participate in the care of your patient, Aime Tao.     Impression and Plan     1. Coronary artery disease. Aiem has known coronary artery disease having had surgical revascularization in September 2001. At that time, he had an DEVIN graft to the LAD and saphenous vein graft to the right coronary artery.      This is stable. Patient reports no concerning chest pain or other symptoms.     2. Hypertension. Blood pressure is mildly elevated in the office today.  Other readings at home have been more favorable.     3. History of conduction disease (status post Medtronic permanent pacemaker placement 2 March 2011). Patient underwent generator change 5 February 2019. Recent device interrogation today interrogation normal pacer and magnet function.       4.  Atrial fibrillation.  Device interrogation 4 December 2020 revealed evidence of atrial fibrillation.  Ventricular response is well controlled on device monitoring.  Patient reports no subjective palpitations and is essentially asymptomatic with the rhythm disturbance.  Plan to simply pursue a rate control strategy.  He is on apixaban for CVA prophylaxis.    Continue apixaban.    5.  Systolic murmur.  Echocardiogram 19 December 2019 revealed mild aortic valve sclerosis, but no significant stenosis.    6. Dyslipidemia.  Lipid profile 5 April 2019 was close to target with LDL 90 mg/dL and HDL 56 mg/dL.     Plan on follow-up in 6 months.  Aime will be followed intermittently through the device clinic per protocol as well.           History of Present Illness    Once again I would like to thank you again  for asking me to participate in the care of your patient, Aime Tao.  As you know, but to reiterate for my own records, Aime Tao is a 89 y.o. male with known coronary disease, having had coronary artery bypass surgery in September 2001. At that time, he had an DEVIN graft to the LAD and saphenous vein graft to the right coronary artery. Historically, he has had well-preserved LV systolic performance.      In 2011 he had presented with bradycardia and evidence of advanced conduction disease for which he underwent permanent pacemaker placement (Medtronic device implanted 2 March 2011).  Patient underwent generator change 5 February 2019.     In follow-up today, Aime is without complaint. He denies any chest pain, shortness of breath, or diminished exercise tolerance. He reports no palpitations (despite atrial fibrillation) or lightheadedness.  He denies any fevers, chills, or other constitutional symptoms.    Further review of systems is otherwise negative/noncontributory (medical record and 13 point review of systems reviewed as well and pertinent positives noted).         Cardiac Diagnostics      Echocardiogram 19 December 2019:  1. Normal left ventricular size and systolic performance with ejection fraction of 60%.  2. Mild aortic valve sclerosis without significant stenosis.  3. Normal right ventricular size and systolic performance.  4. Mild left atrial enlargement.  Right atrium of normal dimension.    When compared to prior echocardiogram 20 June 2017, no significant change.    Echocardiogram 20 June 2017:  1. Normal left ventricle size and systolic performance with ejection fraction of 70-75%.  2. Aortic valve sclerosis without significant stenosis.  3. Normal right ventricular size and systolic performance.  4. Normal atrial dimensions.    Echocardiogram 23 March 2015 (personally reviewed):  1. Normal left ventricular size and systolic performance. The ejection fraction is estimated to be  "50-55%.  2. Left ventricular wall thickness is mildly increased.  3. There is mild aortic valve sclerosis without significant valvular stenosis.  4. The left atrium is mildly enlarged.    When compared to the prior real-time echocardiogram dated 11 March 2013, there has been little appreciable interval change.    Echocardiogram 17 December 2011:  1. Normal LV size and function with ejection fraction of 60%.    2. Mild left ventricular hypertrophy.  3. Mild-moderate mitral insufficiency.    4. Mild right atrial enlargement.    Device interrogation 4 December 2020 (Medtronic [id number] [place] XT DR MRI device implanted 5 February 2019):  1. Type: alert remote pacemaker transmission for AT/AF exceeding burden.  2. Presenting rhythm: Atrial fibrillation with ventricular pacing, rate 76 bpm.  3. Battery/lead status: stable  4. Arrhythmias; since D4 June 2020, one mode switch, EGM confirms AF, currently in progress 10 hours, burden 0.3%, V-rates do not exceed 120 bpm. No  5. ventricular high rate episodes detected.  6. Anticoagulant: none, takes 325mg ASA.  7. Comments: normal pacemaker function. Routed to device RN.    Device interrogation 19 August 2019 (Medtronic [id number] [place] XT DR MRI device implanted 5 February 2019):  1. Battery/lead status: stable  2. Arrhythmias: since 15 May 2019, total time in AT/AF 3 seconds. No ventricular arrhythmias detected.  3. Comments: normal pacemaker function. Will review battery longevity estimate with device RN.   4. ADD: Contacted TS. Reviewed further. Reviewed 15 day detail on RV lead to ensure no aborted reading and accurate auto capture. TS thinks it related to higher outputs on RV lead and that the battery is regularizing since it has been in for 6 months now. She is confirming with an : \"When running the calculator estimation at RV outputs 2.75V/.4ms and running it at 3.75V/.4ms, there was about a 2 year difference b/t the 2. In looking at the actual CL estimation, it was " "shown that the May 2019 showed estimated longevity b/t 8.6-9.4 years and the August check showed 6.5-7.1 years longevity estimation(also indicating about a 2 years difference in longevity). Based on the information provided by the engineers, this seems reasonable that an increase in RV outputs to 3.75V/.4ms could cause the observed reduction in longevity. There may be additional differences in the estimations due to telemetry or other changes in outputs, etc.\" Therefore, will continue to monitor at this time.    Device interrogation 22 August 2018 (Medtronic [id number] EnRhythm device implanted to March 2011):  1. Presenting rhythm: atrial synchronous ventricular pacing, rate 70 bpm.  2. Battery/lead status: stable, nearing ADRIAN, continue monthly remotes.  3. Arrhythmias: since 19 July 2018, none detected.  4. Comments: normal pacemaker function.     Device interrogation 13 April 2017 (Medtronic device implanted 2 March 2011):  1. Atrial sensing with ventricular pacing.  2. Stable battery and lead status.  3. Since 28 December 2016, no arrhythmias detected.  4. Normal pacemaker function.    Device interrogation 7 April 2016 (Medtronic device implanted 2 March 2011):  1. Presenting rhythm is AV sequential pacing.  2. Stable battery and lead status.  3. Since 9 September 2015, no arrhythmias detected.  4. Normal pacemaker function.    Device interrogation 9 December 2015 (Medtronic device implanted 2 March 2011):  1. Presenting rhythm is AV sequential pacing.  2. Stable battery and lead status.  3. Since 9 September 2015, no arrhythmias detected.  4. Normal pacemaker function.                Physical Examination       /74 (Patient Site: Right Arm, Patient Position: Sitting, Cuff Size: Adult Regular)   Pulse 86   Resp 16   Ht 5' 7.52\" (1.715 m)   Wt 173 lb (78.5 kg)   BMI 26.68 kg/m          Wt Readings from Last 3 Encounters:   01/22/21 173 lb (78.5 kg)   12/11/20 169 lb (76.7 kg)   12/27/19 179 lb (81.2 kg) "     The patient is alert and oriented times three. Sclerae are anicteric. Mucosal membranes are moist. Jugular venous pressure is normal. No significant adenopathy/thyromegally appreciated. Lungs are clear with good expansion. On cardiovascular exam, the patient has a fairly regular S1 and S2.  There is a 2/6 systolic murmur heard best at right sternal border.  Abdomen is soft and non-tender. Extremities reveal no clubbing, cyanosis, or edema.         Family History/Social History/Risk Factors   Patient does not smoke.  Family history reviewed, and family history includes Hypertension in his father.          Medications  Allergies   Current Outpatient Medications   Medication Sig Dispense Refill   ? apixaban ANTICOAGULANT (ELIQUIS) 5 mg Tab tablet Take 1 tablet (5 mg total) by mouth 2 (two) times a day. 180 tablet 3   ? atorvastatin (LIPITOR) 80 MG tablet Take 80 mg by mouth at bedtime.      ? cyanocobalamin 1000 MCG tablet Take 1,000 mcg by mouth daily.     ? DOCUSATE CALCIUM (STOOL SOFTENER ORAL) Take 1-2 tablets by mouth daily.     ? losartan (COZAAR) 100 MG tablet Take 100 mg by mouth at bedtime.            ? phenytoin (DILANTIN EXTENDED) 100 MG ER capsule Take 300 mg by mouth daily with supper.            ? psyllium seed, sugar, (METAMUCIL) Powd daily.      ? VIT C/VIT E/LUTEIN/MIN/OMEGA-3 (OCUVITE ORAL) 1 tablet daily.     ? calcium polycarbophil (FIBERCON) 625 mg tablet Take 1 tablet by mouth daily.     ? IBUPROFEN (ADVIL ORAL) Take 800 mg by mouth as needed.              No current facility-administered medications for this visit.       Allergies   Allergen Reactions   ? Definity [Perflutren Lipid Microspheres] Other (See Comments)     Patient c/o feeling flushed after definity injected.    ? Iodinated Contrast Media    ? Neomycin-Bacitracin-Polymyxin    ? Niacin Other (See Comments)          Lab Results   Lab Results   Component Value Date     02/05/2019    K 4.1 02/05/2019     02/05/2019    CO2  25 02/05/2019    BUN 20 02/05/2019    CREATININE 0.82 02/05/2019    CALCIUM 8.5 02/05/2019     Lab Results   Component Value Date    WBC 5.3 02/05/2019    HGB 12.5 (L) 02/05/2019    HCT 39.0 (L) 02/05/2019    MCV 94 02/05/2019     02/05/2019     Lab Results   Component Value Date    INR 1.06 03/25/2011     Lab Results   Component Value Date    BNP 70 03/25/2011     Lab Results   Component Value Date    CKMB 2 03/25/2011    TROPONINI <0.01 03/25/2011

## 2021-06-30 NOTE — PROGRESS NOTES
Progress Notes by Con Elder MD at 12/11/2020  9:50 AM     Author: Con Elder MD Service: -- Author Type: Physician    Filed: 12/11/2020 10:34 AM Encounter Date: 12/11/2020 Status: Signed    : Con Elder MD (Physician)            [facility] Note    Aime Tao was advised by Dr. Dias to meet with me today at the [facility] to provide advice regarding anticoagulation for atrial fibrillation.     Assessment:    1. Atrial fibrillation (H)  apixaban ANTICOAGULANT (ELIQUIS) 5 mg Tab tablet    CHADS2 VASC score is 4 for age, coronary artery disease, and hypertension       Plan:    1. Discontinue aspirin and start Eliquis 5 mg p.o. twice daily; he was given a certificate for free 30-day supply and an electronic prescription for 1 year supply was sent to his [facility] pharmacy as he requested.  2. Return to see Dr. Dias in 6 to 8 weeks to monitor his change in his medication regimen.    An After Visit Summary was printed and given to the patient.    Primary Cardiologist:  Dr. Twin Dias    Current History:    Aime had a recent device check that demonstrated atrial fibrillation with a burden of 0.3%.  Therefore it was recommended he come to the rapid access clinic to discuss anticoagulation.  His son, Edward, accompanied him to the visit.    Aime has had no awareness of his rhythm change.  Specifically, he denies palpitations, lightheadedness, syncope, breathlessness or chest discomfort.    He thought this might have come about because he was using ice to treat arthritis.  I reassured him that his treatment of arthritis had nothing to do with the development of atrial fibrillation and explained that it is very common health issue for gentleman of his age.    We discussed the role of anticoagulation with antithrombin agents and how they are superior to antiplatelet agents such as aspirin for lowering the  risk of stroke in patients with atrial fibrillation and an elevated CHADS2 VASC score. His score is 4 for age over 75, hypertension, and coronary artery disease.  We also briefly reviewed the role of the left atrial appendage occlusion device, should he have future difficulties with anticoagulation.  In addition, I reviewed warfarin versus the direct oral anticoagulants.  Both Aime and his son were strongly in favor of using a direct oral anticoagulant and they commented that his wife, Virginia, is currently taking Eliquis 2.5 mg p.o. twice daily.  We discussed how he does have advanced age but does not have low body weight or renal dysfunction that would cause us to use the lower dose at this time.    Patient Active Problem List   Diagnosis   ? Dyslipidemia   ? Coronary Artery Disease   ? Adult Sleep Apnea   ? Cardiac pacemaker in situ, dual chamber   ? Complete heart block (H)   ? Sensorineural hearing loss (SNHL) of both ears   ? Essential hypertension   ? Peripheral arterial disease (H)   ? Retinitis pigmentosa   ? Seizure disorder (H)   ? Chronic anemia       Past Medical History:  Past Medical History:   Diagnosis Date   ? Cancer (H)    ? Chronic anemia 08/30/2013   ? Coronary artery disease    ? Hyperlipidemia    ? Hypertension    ? KARYN (obstructive sleep apnea)    ? Pacemaker battery depletion 01/23/2019    Added automatically from request for surgery [id number]   ? PAD (peripheral artery disease) (H)    ? Second Degree A-V Block    ? Seizure disorder (H)    ? Skin cancer    ? Ventricular Tachycardia        Past Surgical History:  Past Surgical History:   Procedure Laterality Date   ? A-V CARDIAC PACEMAKER INSERTION  2011   ? CORONARY ARTERY BYPASS GRAFT  09/17/2001    Comments: DEVIN^LAD, SVG^RCA   ? PACEMAKER GENERATOR CHANGE  02/05/2019       Family History:  Family History   Problem Relation Age of Onset   ? Hypertension Father        Social History:   reports that he has never smoked. He has never used  smokeless tobacco. He reports that he does not use drugs.  Social History     Socioeconomic History   ? Marital status:      Spouse name: Virginia   ? Number of children: Not on file   ? Years of education: 18   ? Highest education level: Not on file   Occupational History     Employer: RETIRED   Social Needs   ? Financial resource strain: Not on file   ? Food insecurity     Worry: Not on file     Inability: Not on file   ? Transportation needs     Medical: Not on file     Non-medical: Not on file   Tobacco Use   ? Smoking status: Never Smoker   ? Smokeless tobacco: Never Used   Substance and Sexual Activity   ? Alcohol use: Not on file   ? Drug use: No   ? Sexual activity: Not on file   Lifestyle   ? Physical activity     Days per week: Not on file     Minutes per session: Not on file   ? Stress: Not on file   Relationships   ? Social connections     Talks on phone: Not on file     Gets together: Not on file     Attends Zoroastrianism service: Not on file     Active member of club or organization: Not on file     Attends meetings of clubs or organizations: Not on file     Relationship status: Not on file   ? Intimate partner violence     Fear of current or ex partner: Not on file     Emotionally abused: Not on file     Physically abused: Not on file     Forced sexual activity: Not on file   Other Topics Concern   ? Not on file   Social History Narrative    He served in the [employer] from 1948 to 1952. 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       Medications:  Outpatient Encounter Medications as of 12/11/2020   Medication Sig Dispense Refill   ? atorvastatin (LIPITOR) 80 MG tablet Take 80 mg by mouth at bedtime.      ? calcium polycarbophil (FIBERCON) 625 mg tablet Take 1 tablet by mouth daily.     ? cyanocobalamin 1000 MCG tablet Take 1,000 mcg by mouth daily.     ? DOCUSATE CALCIUM  "(STOOL SOFTENER ORAL) Take 1-2 tablets by mouth daily.     ? IBUPROFEN (ADVIL ORAL) Take 800 mg by mouth as needed.            ? losartan (COZAAR) 100 MG tablet Take 100 mg by mouth at bedtime.            ? phenytoin (DILANTIN EXTENDED) 100 MG ER capsule Take 300 mg by mouth daily with supper.            ? psyllium seed, sugar, (METAMUCIL) Powd daily.      ? VIT C/VIT E/LUTEIN/MIN/OMEGA-3 (OCUVITE ORAL) 1 tablet daily.     ? [DISCONTINUED] aspirin 325 MG tablet Take 325 mg by mouth daily.      ? apixaban ANTICOAGULANT (ELIQUIS) 5 mg Tab tablet Take 1 tablet (5 mg total) by mouth 2 (two) times a day. 180 tablet 3     No facility-administered encounter medications on file as of 12/11/2020.        Allergies  Definity [perflutren lipid microspheres], Iodinated contrast media, Neomycin-bacitracin-polymyxin, and Niacin    Review of Systems    General: WNL  Eyes: WNL  Ears/Nose/Throat: WNL  Lungs: WNL  Heart: WNL  Stomach: Constipation  Bladder: WNL  Muscle/Joints: WNL  Skin: WNL  Nervous System: Falls  Mental Health: WNL     Blood: WNL    Objective:    Wt Readings from Last 5 Encounters:   12/11/20 169 lb (76.7 kg)   12/27/19 179 lb (81.2 kg)   12/19/19 170 lb (77.1 kg)   10/29/19 170 lb (77.1 kg)   02/12/19 169 lb (76.7 kg)      5' 7.25\" (1.708 m)  Body mass index is 26.27 kg/m .  /70 (Patient Site: Left Arm, Patient Position: Sitting, Cuff Size: Adult Regular)   Pulse 72   Resp 16   Ht 5' 7.25\" (1.708 m)   Wt 169 lb (76.7 kg)   BMI 26.27 kg/m       Physical Exam:    General Appearance: Alert and not in distress   HEENT: No scleral icterus; the mucous membranes are pink and moist   Neck: No cervical bruits, adenopathy, or thyromegaly; jugular venous pressure is less than 5 cm   Chest: The spine is straight and the chest is symmetric   Lungs: Respirations are unlabored; the lungs are clear to auscultation   Cardiovasular: Auscultation reveals normal first and second heart sounds with a grade 1/6 systolic " ejection murmur, but no rubs or gallops   Extremities: No cyanosis, clubbing or edema   Skin: No xanthelasma   Neurologic: Normal coordination; he is using a cane for ambulation   Psychiatric: Mood and affect are normal       Cardiac testing:    Echocardiogram:   Results for orders placed during the hospital encounter of 12/19/19   Echo Complete [ECH10] 12/19/2019    Narrative   Normal left ventricular size with normal wall thickness.    Left ventricle ejection fraction is normal. The estimated left   ventricular ejection fraction is 60%.    Normal right ventricular size and systolic function.    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    When compared to the previous study dated 6/20/2017, no significant   change.          Cardiac Catheterization:   Results for orders placed in visit on 09/14/01   CV Cardiac Catheterization [CATH01] 09/14/2001    Narrative See Historical Hospital Medical Record for documentation       Imaging:    Remote Device Check    Result Date: 12/7/2020  Type: alert remote pacemaker transmission for AT/AF exceeding burden. Presenting rhythm: atrial fibrillation with ventricular pacing, rate 76 bpm. Battery/lead status: stable Arrhythmias; since 6/24/20, one mode switch, EGM confirms AF, currently in progress 10 hours, burden 0.3%, V-rates do not exceed 120 bpm. No ventricular high rate episodes detected. Anticoagulant: none, takes 325mg ASA. Comments: normal pacemaker function. Routed to device RN. prd. Addendum: Transmission and chart reviewed. Please see Telephone Note in Epic. Routed to Dr. Dias regarding anticoagulation status. SMW       Lab Review:    Lab Results   Component Value Date     02/05/2019    K 4.1 02/05/2019     02/05/2019    CO2 25 02/05/2019    BUN 20 02/05/2019    CREATININE 0.82 02/05/2019    CALCIUM 8.5 02/05/2019     Lab Results   Component Value Date    WBC 5.3 02/05/2019    HGB  12.5 (L) 02/05/2019    HCT 39.0 (L) 02/05/2019    MCV 94 02/05/2019     02/05/2019     BNP (pg/mL)   Date Value   03/25/2011 70           Much or all of the text in this note was generated through the use of the Dragon Dictate voice-to-text software. Errors in spelling or words which seem out of context are unintentional. Sound alike errors, in particular, may have escaped editing.

## 2021-06-30 NOTE — PROGRESS NOTES
"Progress Notes by Miriam Strange RN at 12/4/2020  3:15 PM     Author: Miriam Strange RN Service: -- Author Type: Registered Nurse    Filed: 12/4/2020  3:29 PM Encounter Date: 12/4/2020 Status: Signed    : Miriam Strange RN (Registered Nurse)       Type: alert remote pacemaker transmission for AT/AF exceeding burden.  Presenting rhythm: atrial fibrillation with ventricular pacing, rate 76 bpm.  Battery/lead status: stable  Arrhythmias; since 6/24/20, one mode switch, EGM confirms AF, currently in progress 10 hours, burden 0.3%, V-rates do not exceed 120 bpm. No ventricular high rate episodes detected.  Anticoagulant: none, takes 325mg ASA.  Comments: normal pacemaker function. Routed to device RN. Freddy.    Esther Morse, RDCS  P [facility] Device Pool             [facility]   Alert for AF, in progress 10 hrs, V rates controlled, on ASA only. Pt was due for annual in September but did not schedule. What should next appt be?        Addendum: Transmission and chart reviewed.  Appears to be new onset atrial flutter (AFL). Call to Mr. Tao, he states, \"I'm fine.  I've been out raking today and sweeping.  No, no troubles.\"  He denies shortness of breath, light headedness, dizziness, pre-syncope, syncope, chest pain, diaphoresis, and nausea.  Routed to Dr. Dias regarding anticoagulation status.  I thanked Mr. Tao for returning our call.  Please also see Telephone Note dated today, 12/04/2020.   Miriam Strange RN, MA  Device Nurse  [facility]         "